# Patient Record
Sex: FEMALE | HISPANIC OR LATINO
[De-identification: names, ages, dates, MRNs, and addresses within clinical notes are randomized per-mention and may not be internally consistent; named-entity substitution may affect disease eponyms.]

---

## 2022-08-10 PROBLEM — Z00.00 ENCOUNTER FOR PREVENTIVE HEALTH EXAMINATION: Status: ACTIVE | Noted: 2022-08-10

## 2022-08-11 ENCOUNTER — APPOINTMENT (OUTPATIENT)
Dept: CARDIOLOGY | Facility: CLINIC | Age: 72
End: 2022-08-11

## 2022-08-11 ENCOUNTER — NON-APPOINTMENT (OUTPATIENT)
Age: 72
End: 2022-08-11

## 2022-08-11 VITALS
BODY MASS INDEX: 26.5 KG/M2 | HEIGHT: 64.17 IN | OXYGEN SATURATION: 99 % | HEART RATE: 69 BPM | DIASTOLIC BLOOD PRESSURE: 79 MMHG | TEMPERATURE: 98.8 F | SYSTOLIC BLOOD PRESSURE: 145 MMHG | WEIGHT: 155.25 LBS

## 2022-08-11 DIAGNOSIS — R06.00 DYSPNEA, UNSPECIFIED: ICD-10-CM

## 2022-08-11 PROCEDURE — 99204 OFFICE O/P NEW MOD 45 MIN: CPT | Mod: 25

## 2022-08-11 PROCEDURE — 93000 ELECTROCARDIOGRAM COMPLETE: CPT

## 2022-08-11 NOTE — PHYSICAL EXAM
[Well Developed] : well developed [Well Nourished] : well nourished [No Acute Distress] : no acute distress [Normal Venous Pressure] : normal venous pressure [Normal S1, S2] : normal S1, S2 [No Murmur] : no murmur [Clear Lung Fields] : clear lung fields [Good Air Entry] : good air entry [No Respiratory Distress] : no respiratory distress  [Soft] : abdomen soft [Non Tender] : non-tender [Normal Gait] : normal gait [No Edema] : no edema [Moves all extremities] : moves all extremities [No Focal Deficits] : no focal deficits

## 2022-08-29 ENCOUNTER — APPOINTMENT (OUTPATIENT)
Dept: CARDIOLOGY | Facility: CLINIC | Age: 72
End: 2022-08-29

## 2022-08-29 VITALS — HEART RATE: 66 BPM | SYSTOLIC BLOOD PRESSURE: 138 MMHG | DIASTOLIC BLOOD PRESSURE: 92 MMHG

## 2022-08-29 PROCEDURE — 93015 CV STRESS TEST SUPVJ I&R: CPT

## 2022-08-29 PROCEDURE — ZZZZZ: CPT

## 2022-08-29 PROCEDURE — 93306 TTE W/DOPPLER COMPLETE: CPT

## 2022-08-29 PROCEDURE — 99213 OFFICE O/P EST LOW 20 MIN: CPT | Mod: 25

## 2022-08-29 NOTE — DISCUSSION/SUMMARY
[FreeTextEntry1] : 71F with HTN, hx of cva presents to establish CV care\par \par 1. GAYLE\par -pt walks daily but states if she walks fast she gets sob and has to stop\par -pt with some risk factors for cad (Age, hx, hx of cva)\par -will check tte to r/o structural heart dz\par -pt would like to try exercise stress testing prior to any other testing, will check exercise stress test\par \par f/u after initial testing

## 2022-08-29 NOTE — HISTORY OF PRESENT ILLNESS
[FreeTextEntry1] : 71F w HTN, hx of cva presents to establish care\par Sent in by PMD: Dr Bethea, [facility]\par \par pt [place]ic speaking, granddaughter here to translate ( offered, but declined).\par \par pt sent from pmd for eval. \par \par pt overall feeling well. denies CP, SOB, at rest or on light exertion. but if she does heavy exertion, she might get winded. Denies palpitations, dizziness, diaphoresis, syncope, LE edema, orthopnea.\par \par \par Prev cardiac history: none\par \par Exercise: walking daily, 30-45 min, no symptoms\par Diet:\par \par Previous cardiac testing: none\par Recent labs:\par \par \par EKG: SR 69\par \par \par Med hx: hx of cva. htn\par OBGYN hx: 6 children.  No Hx of gestational DM, gestational HTN, preeclampsia. + Hx of miscarriage x2. Currently post menopause. \par Sx hx: lasic. \par Family hx:M : CVA\par Social hx: from [facility]. lives in [place] with  and husbands son. no tob/etoh/drugs\par Meds: enalapril 20 synthroid\par Allergies: nkda \par

## 2023-12-12 ENCOUNTER — APPOINTMENT (OUTPATIENT)
Dept: CARDIOLOGY | Facility: CLINIC | Age: 73
End: 2023-12-12
Payer: MEDICARE

## 2023-12-12 VITALS
OXYGEN SATURATION: 98 % | WEIGHT: 154 LBS | HEART RATE: 64 BPM | DIASTOLIC BLOOD PRESSURE: 97 MMHG | BODY MASS INDEX: 26.29 KG/M2 | SYSTOLIC BLOOD PRESSURE: 171 MMHG | TEMPERATURE: 98.3 F | HEIGHT: 64 IN

## 2023-12-12 DIAGNOSIS — I10 ESSENTIAL (PRIMARY) HYPERTENSION: ICD-10-CM

## 2023-12-12 PROCEDURE — 93000 ELECTROCARDIOGRAM COMPLETE: CPT

## 2023-12-12 PROCEDURE — 99215 OFFICE O/P EST HI 40 MIN: CPT | Mod: 25

## 2024-06-24 ENCOUNTER — RX RENEWAL (OUTPATIENT)
Age: 74
End: 2024-06-24

## 2024-06-24 RX ORDER — AMLODIPINE BESYLATE 10 MG/1
10 TABLET ORAL DAILY
Qty: 90 | Refills: 0 | Status: ACTIVE | COMMUNITY
Start: 2023-12-12 | End: 1900-01-01